# Patient Record
Sex: MALE | Race: WHITE | Employment: OTHER | URBAN - NONMETROPOLITAN AREA
[De-identification: names, ages, dates, MRNs, and addresses within clinical notes are randomized per-mention and may not be internally consistent; named-entity substitution may affect disease eponyms.]

---

## 2018-08-28 ENCOUNTER — HOSPITAL ENCOUNTER (OUTPATIENT)
Dept: PHYSICAL THERAPY | Age: 33
Setting detail: THERAPIES SERIES
Discharge: HOME | End: 2018-08-28
Payer: MEDICARE

## 2018-08-28 PROCEDURE — G8978 MOBILITY CURRENT STATUS: HCPCS

## 2018-08-28 PROCEDURE — 97162 PT EVAL MOD COMPLEX 30 MIN: CPT

## 2018-08-28 PROCEDURE — G8979 MOBILITY GOAL STATUS: HCPCS

## 2018-08-28 PROCEDURE — 97140 MANUAL THERAPY 1/> REGIONS: CPT

## 2018-08-28 ASSESSMENT — PAIN DESCRIPTION - PAIN TYPE: TYPE: CHRONIC PAIN

## 2018-08-28 ASSESSMENT — PAIN DESCRIPTION - LOCATION: LOCATION: LEG

## 2018-08-28 ASSESSMENT — PAIN SCALES - GENERAL: PAINLEVEL_OUTOF10: 4

## 2018-08-28 NOTE — PLAN OF CARE
[facility]           Phone: [phone]             Outpatient Physical Therapy  Fax: [phone]                                           Date: 2018  Patient: Dianna Chaney : 1985 CSN #: [id number]   Referring Practitioner:  Dr. 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 Pt has a history of cancer and diabetes that has contributed to his LE lymphedema. Pt will benefit from skilled PT in order to address these deficits.      [x] Primary Impairment :   G Code:    [x] Mobility         [] Carry        [] Body Position       [] Self Care      [] Other:   Functional Impairment Current:  [] 0%    [] 1-19% [] 20-39% [] 40-59% [x] 60-79%    [] 80-99% [] 100%  Functional Impairment Goal:  [] 0%    [] 1-19% [x] 20-39% [] 40-59% [] 60-79%    [] 80-99% [] 100%  G Code Functional Impairment determined by:  [x] Clinical Judgment   [] Outcome Measure:     Goals  Short term goals  Time Frame for Short term goals: 3 weeks  Short term goal 1: Pt will be educated on his POC and HEP-met  Short term goal 2: Pt will demonstrate 1-2cm decrease in BLE girth measurements in order to increase standing tolerance  Short term goal 3: Pt will be compliant with compression and elevation   Long term goals  Time Frame for Long term goals : 6 weeks  Long term goal 1: Pt will be independent with ankle pumps and compression   Long term goal 2: Pt will report 70% improvement in symptoms and leg pain with

## 2018-08-28 NOTE — PROGRESS NOTES
Phone: [phone]                       [facility]          Fax: [phone]                      Outpatient Physical Therapy                                                                      Evaluation  Date: 2018  Patient: Wayne Ann  : 1985  CSN #: [id number]  Referring Practitioner: Dr. Nahum Bhandari     Referral Date : 18     Diagnosis: lymphoma of lymph nodes head, face and neck C85.91    Treatment Diagnosis: BLE lymphedema   Onset Date: 18  PT Insurance Information: Medicare/ Medicaid   Total # of Visits Approved: 18   Total # of Visits to Date: 1  No Show: 0  Canceled Appointment: 0     Subjective  Subjective: Pt reports he was diagnosed with non hodgkins lymphoma in  and has been cancer free since , pt started with BLE edema that progressively got worse has his diabetes got worse from radiation treatments. Pt currently is not working and states he tries to keep his legs elevated and wears compression garments   Additional Pertinent Hx: non hodgkin's lymphoma, diabetes  Pain Screening  Patient Currently in Pain: Yes  Pain Assessment  Pain Assessment: 0-10  Pain Level: 4  Pain Type: Chronic pain  Pain Location: Leg          Objective     Observation/Palpation  Edema: R LE midfoot 27.3cm, ankle 29.9cm, 3\" above 31.9cm, 6\" above 37cm, midcalf 48.4cm, knee 46cm, midthigh 58cm: L LE midfoot 28.5cm, ankle 33.8cm, 3\" above 33.2cm, 6\" above 38.0cm, calf 50.4cm, knee 47.7cm, midthigh 62cm         Assessment  Assessment: Pt is a 28year old male that presents with chronic lower leg lymphedema. Pt has a history of cancer and diabetes that has contributed to his LE lymphedema. Pt will benefit from skilled PT in order to address these deficits.    Prognosis: Fair        Decision Making: Medium Complexity    Patient Education  Pt educated on her POC and HEP   Pt verbalized/demonstrated good understanding:     [x] Yes         [] No, pt required further

## 2018-08-31 ENCOUNTER — APPOINTMENT (OUTPATIENT)
Dept: PHYSICAL THERAPY | Age: 33
End: 2018-08-31
Payer: MEDICARE

## 2018-11-19 NOTE — DISCHARGE SUMMARY
decrease in girth measurements in order to increase ambulation tolerance   Long term goal 4: Pt will be fitted for at home pump for maintainence       Reason for Discharge  [] Goals Achieved                       [] Poor Follow Through/Attendance                  [] Optimal Function Achieved     [x] Patient Discharged Self    [] Hospitalization                         [] Physician discharge      Thank you for this referral      Paige Hernandez PT, DPT                    Date: 11/19/2018

## 2020-10-09 PROBLEM — E23.0 HYPOGONADOTROPIC HYPOGONADISM (HCC): Status: ACTIVE | Noted: 2020-10-09

## 2022-01-02 PROBLEM — E66.01 CLASS 3 SEVERE OBESITY DUE TO EXCESS CALORIES WITH SERIOUS COMORBIDITY AND BODY MASS INDEX (BMI) OF 45.0 TO 49.9 IN ADULT (HCC): Chronic | Status: ACTIVE | Noted: 2022-01-02

## 2022-01-02 PROBLEM — G47.33 OSA ON CPAP: Chronic | Status: ACTIVE | Noted: 2022-01-02

## 2022-01-02 PROBLEM — F31.9 AFFECTIVE PSYCHOSIS, BIPOLAR (HCC): Chronic | Status: ACTIVE | Noted: 2022-01-02

## 2022-01-02 PROBLEM — Z99.89 OSA ON CPAP: Chronic | Status: ACTIVE | Noted: 2022-01-02

## 2022-01-02 PROBLEM — E03.9 HYPOTHYROIDISM: Chronic | Status: ACTIVE | Noted: 2022-01-02

## 2022-01-02 PROBLEM — Z85.72 HX OF LYMPHOMA: Status: ACTIVE | Noted: 2022-01-02

## 2022-01-02 PROBLEM — G25.81 RLS (RESTLESS LEGS SYNDROME): Chronic | Status: ACTIVE | Noted: 2022-01-02

## 2022-01-02 PROBLEM — I10 PRIMARY HYPERTENSION: Chronic | Status: ACTIVE | Noted: 2022-01-02

## 2022-04-14 PROBLEM — D75.1 SECONDARY POLYCYTHEMIA: Status: ACTIVE | Noted: 2022-04-14

## 2022-05-12 PROBLEM — E11.40 TYPE 2 DIABETES MELLITUS WITH DIABETIC NEUROPATHY (HCC): Status: ACTIVE | Noted: 2022-05-12

## 2022-05-12 PROBLEM — E11.65 TYPE 2 DIABETES MELLITUS WITH HYPERGLYCEMIA (HCC): Status: ACTIVE | Noted: 2022-05-12

## 2022-06-17 PROBLEM — E11.69 TYPE 2 DIABETES MELLITUS WITH OTHER SPECIFIED COMPLICATION (HCC): Status: ACTIVE | Noted: 2022-06-17

## 2022-08-26 PROBLEM — K43.9 VENTRAL HERNIA WITHOUT OBSTRUCTION OR GANGRENE: Status: ACTIVE | Noted: 2022-08-26

## 2022-10-18 PROBLEM — E55.9 VITAMIN D DEFICIENCY: Status: ACTIVE | Noted: 2022-10-18

## 2022-10-18 PROBLEM — E78.2 MIXED HYPERLIPIDEMIA: Status: ACTIVE | Noted: 2022-10-18

## 2023-01-03 PROBLEM — R53.83 FATIGUE: Status: ACTIVE | Noted: 2023-01-03

## 2023-03-31 PROBLEM — E66.01 MORBID OBESITY DUE TO EXCESS CALORIES (HCC): Status: ACTIVE | Noted: 2022-01-02

## 2023-07-03 ENCOUNTER — HOSPITAL ENCOUNTER (OUTPATIENT)
Dept: PHYSICAL THERAPY | Age: 38
Setting detail: THERAPIES SERIES
Discharge: HOME | End: 2023-07-03
Payer: MEDICARE

## 2023-07-03 PROCEDURE — 97162 PT EVAL MOD COMPLEX 30 MIN: CPT

## 2023-07-03 PROCEDURE — 97140 MANUAL THERAPY 1/> REGIONS: CPT

## 2023-07-03 NOTE — PROGRESS NOTES
Phone: [address]          Fax: [phone]                      Outpatient Physical Therapy                                                             Lymphedema Evaluation  Date: 7/3/2023  Patient: Ania Light  : 1985  [facility] #: [id number]  Referring Physician: Referring Provider (secondary): Montana Castillo    Diagnosis: BLE lyphedema    Treatment Diagnosis: BLe lymphedema  Onset Date: 23  PT Insurance Information: Medicare/Medicaid  Total # of Visits Approved: 12   Total # of Visits to Date: 1     Subjective  Subjective: Pt reports a chronic history of BLE lymphedema and cellulitis. pt initiate pump treatments in 2018 but had to stop due to chronic cellulitis. pt states he has not had cellulitis in approx one year and wants to start treatments again.  Pt has a history of diabetic neuropathy and partial toe amputation on L foot along with an unhealed wound being doctored by Dr. Toñito Card  Additional Pertinent Hx: cancer hodgkins lymphoma, diabetes, HTN, anxiety, bipolar, depression    Lymph Assessment      Skin Integumentary:   Skin Integrity: Wound (add Wound LDA) (wound L third toe)  Pitting Scale Area 1: 1+  Skin Color: Red  Skin Texture: Hyperpigmentation  Turgor: Shiney/hard  Hair Growth: Absent  Edema Rebound: Quick  Stemmer Sign: Positive    Signs of Constriction (if applicable):   Fibrotic Areas: Yes    Stage of Lymphedema: Stage 2: Spontaneously Irreversible Stage  Description:      Lymphedema Classification:   Type: Secondary Lymphedema  Left: Moderate     Right: Moderate    Measurements: Area Measured: R LE, L LE (R LE 3\" above ankle 36.2cm, 6\" above 42.4cm: L LE 3\" above 39cm, 6\" above 46.3cm)      Right Measurements Left Measurements   R LE Pre Girth Measurement (cm)  5th Tuberosity (cm): 25.7  Lateral malleolus: 31.6  Calf (cm): 53.1  Mid Knee (cm): 56.2  Thigh (cm): 65.1  Total Girth (cm): 231.7 L LE Pre Girth Measurement (cm)  5th Tuberosity

## 2023-07-06 ENCOUNTER — HOSPITAL ENCOUNTER (OUTPATIENT)
Dept: PHYSICAL THERAPY | Age: 38
Setting detail: THERAPIES SERIES
Discharge: HOME | End: 2023-07-06
Payer: MEDICARE

## 2023-07-06 PROCEDURE — 97110 THERAPEUTIC EXERCISES: CPT

## 2023-07-06 PROCEDURE — 97016 VASOPNEUMATIC DEVICE THERAPY: CPT

## 2023-07-06 PROCEDURE — 97140 MANUAL THERAPY 1/> REGIONS: CPT

## 2023-07-06 NOTE — PROGRESS NOTES
Phone: [address]          Fax: [phone]                      Outpatient Physical Therapy                                                             Lymphedema Treatment  Date: 2023  Patient: Erica Lechuga  : 1985  [facility] #: [id number]  Referring Physician: Referring Provider (secondary): Mil Lemus    Diagnosis: BLE lyphedema    Treatment Diagnosis: BLe lymphedema  Onset Date: 23  PT Insurance Information: Medicare/Medicaid  Total # of Visits Approved: 12   Total # of Visits to Date: 2  No Show: 0  Canceled Appointment: 0     Subjective  Subjective: Pt states he is doing ok today, 5/10 B LE pain. Pt states his legs feel tight today, reports compliance with compression daily.   Additional Pertinent Hx: cancer hodgkins lymphoma, diabetes, HTN, anxiety, bipolar, depression    Lymph Assessment  Skin Integumentary:   Location Description: L 3rd toe  Skin Integrity: Wound (add Wound LDA)  Pitting Scale Area 1: 1+  Skin Color: Red  Skin Texture: Hyperpigmentation  Turgor: Shiney/hard  Hair Growth: Absent  Edema Rebound: Quick  Stemmer Sign: Positive    Signs of Constriction (if applicable):   Papilloma (benign tumor arising from an epithelial layer): Yes  Fibrotic Areas: Yes  Lymphorrhea: No    Stage of Lymphedema: Stage 2: Spontaneously Irreversible Stage  Description:      Lymphedema Classification:   Type: Secondary Lymphedema  Left: Moderate     Right: Moderate    Exercises:  Exercise 1: HEPpt educated on keeping his legs elevated and compressed at 30 mmHg, performing daily exercises including ankle pumps and reducing sodium and sugar in diet    Manual:  Soft Tissue Mobilizaton: MLD to B LE     Skin Integumentary  Skin Color: Red  Skin Integrity: Wound (add Wound LDA)  Turgor: Shiney/hard  Hair Growth: Absent  RLE Complete Decongestion Therapy  Scale: Stage 2  Lymph Drainage Pattern: Lower extremity  Compression Technique: Vaso-pneumatic pump  Force

## 2023-07-11 PROBLEM — Z89.422 HISTORY OF AMPUTATION OF LESSER TOE OF LEFT FOOT (HCC): Status: ACTIVE | Noted: 2023-07-11

## 2023-07-11 PROBLEM — S91.302A OPEN WOUND OF LEFT FOOT: Status: ACTIVE | Noted: 2023-07-11

## 2023-07-12 ENCOUNTER — HOSPITAL ENCOUNTER (OUTPATIENT)
Dept: PHYSICAL THERAPY | Age: 38
Setting detail: THERAPIES SERIES
Discharge: HOME | End: 2023-07-12
Payer: MEDICARE

## 2023-07-12 PROBLEM — I87.2 VENOUS INSUFFICIENCY OF BOTH LOWER EXTREMITIES: Status: ACTIVE | Noted: 2023-07-12

## 2023-07-12 PROCEDURE — 97140 MANUAL THERAPY 1/> REGIONS: CPT

## 2023-07-12 PROCEDURE — 97016 VASOPNEUMATIC DEVICE THERAPY: CPT

## 2023-07-12 PROCEDURE — 97110 THERAPEUTIC EXERCISES: CPT

## 2023-07-12 NOTE — PROGRESS NOTES
Phone: [address]          Fax: [phone]                      Outpatient Physical Therapy                                                             Lymphedema Treatment  Date: 2023  Patient: Marlene Yanes  : 1985  [facility] #: [id number]  Referring Physician: Referring Provider (secondary): Bibiana Hidalgo    Diagnosis: BLE lyphedema    Treatment Diagnosis: BLe lymphedema  Onset Date: 23  PT Insurance Information: Medicare/Medicaid  Total # of Visits Approved: 12   Total # of Visits to Date: 3  No Show: 0  Canceled Appointment: 0     Subjective  Subjective: Pt. reports doing well after compression pump use but now feels today they are \"heavy and full\" feeling. Additional Pertinent Hx: cancer hodgkins lymphoma, diabetes, HTN, anxiety, bipolar, depression    Skin Integumentary:   Location Description: L 3rd toe: band aid over top, seeing Dr. Meño Thapa for follow up next week.   Skin Integrity: Wound (add Wound LDA)  Pitting Scale Area 1: 1+  Skin Color: Red  Skin Texture: Hyperpigmentation  Skin Condition/Temp: Cool  Turgor: Shiney/hard  Hair Growth: Absent  Edema Rebound: Quick  Stemmer Sign: Positive    Signs of Constriction (if applicable):   Papilloma (benign tumor arising from an epithelial layer): Yes  Fibrotic Areas: Yes  Lymphorrhea: No    Stage of Lymphedema: Stage 2: Spontaneously Irreversible Stage  Description:      Lymphedema Classification:   Type: Secondary Lymphedema  Left: Moderate     Right: Moderate      Exercises:  Exercise 1: HEPpt educated on keeping his legs elevated and compressed at 30 mmHg, performing daily exercises including ankle pumps and reducing sodium and sugar in diet    Manual:  Soft Tissue Mobilizaton: MLD to B LE     Skin Integumentary  Skin Color: Red  Skin Condition/Temp: Cool  Skin Integrity: Wound (add Wound LDA)  Turgor: Shiney/hard  Hair Growth: Absent  RLE Complete Decongestion Therapy  Scale: Stage 2  Lymph

## 2023-07-19 ENCOUNTER — HOSPITAL ENCOUNTER (OUTPATIENT)
Dept: PHYSICAL THERAPY | Age: 38
Setting detail: THERAPIES SERIES
Discharge: HOME | End: 2023-07-19
Payer: MEDICARE

## 2023-07-19 PROCEDURE — 97140 MANUAL THERAPY 1/> REGIONS: CPT

## 2023-07-19 PROCEDURE — 97016 VASOPNEUMATIC DEVICE THERAPY: CPT

## 2023-07-19 PROCEDURE — 97110 THERAPEUTIC EXERCISES: CPT

## 2023-07-24 ENCOUNTER — APPOINTMENT (OUTPATIENT)
Dept: PHYSICAL THERAPY | Age: 38
End: 2023-07-24
Payer: MEDICARE

## 2023-07-25 ENCOUNTER — HOSPITAL ENCOUNTER (OUTPATIENT)
Dept: WOUND CARE | Age: 38
Discharge: HOME | End: 2023-07-25
Payer: MEDICARE

## 2023-07-25 VITALS
RESPIRATION RATE: 24 BRPM | BODY MASS INDEX: 40.43 KG/M2 | WEIGHT: 315 LBS | SYSTOLIC BLOOD PRESSURE: 141 MMHG | TEMPERATURE: 97.3 F | HEART RATE: 87 BPM | DIASTOLIC BLOOD PRESSURE: 69 MMHG | HEIGHT: 74 IN

## 2023-07-25 DIAGNOSIS — E08.621 DIABETIC ULCER OF TOE OF LEFT FOOT ASSOCIATED WITH DIABETES MELLITUS DUE TO UNDERLYING CONDITION, WITH NECROSIS OF BONE (HCC): Primary | ICD-10-CM

## 2023-07-25 DIAGNOSIS — M20.62 DEFORMITY OF TOE OF LEFT FOOT: ICD-10-CM

## 2023-07-25 DIAGNOSIS — M86.9 OSTEOMYELITIS OF TOE OF LEFT FOOT (HCC): ICD-10-CM

## 2023-07-25 DIAGNOSIS — E11.42 DIABETIC POLYNEUROPATHY ASSOCIATED WITH TYPE 2 DIABETES MELLITUS (HCC): ICD-10-CM

## 2023-07-25 DIAGNOSIS — L97.524 DIABETIC ULCER OF TOE OF LEFT FOOT ASSOCIATED WITH DIABETES MELLITUS DUE TO UNDERLYING CONDITION, WITH NECROSIS OF BONE (HCC): Primary | ICD-10-CM

## 2023-07-25 PROCEDURE — 99203 OFFICE O/P NEW LOW 30 MIN: CPT | Performed by: PODIATRIST

## 2023-07-25 PROCEDURE — 99203 OFFICE O/P NEW LOW 30 MIN: CPT

## 2023-07-25 NOTE — PROGRESS NOTES
Diabetic Quality Measure Benchmarks:    FSBS:  RESULT 286        0800   Hemoglobin A1c Controlled and is < 8%=   Lab Results   Component Value Date    LABA1C 8.6 (H) 03/28/2023         LDL is <100         Yes  Blood Pressure is under < 140/90               Yes  Tobacco Non use                                         Yes  Aspirin Use/blood thinner                                                    No    Information sent to PCP                                No    Instructions given to Patient regarding follow up and or education      No
PTA [facility] PT [place]   8/24/2023  2:00 PM LAURE Ulrich - CNP [facility] TARHE [facility] [place]   10/17/2023  2:00 PM Kevin Valencia MD [facility] PULM [facility]         SURVEY:    You may be receiving a survey from [employer] regarding your visit today. Please complete the survey to enable us to provide the highest quality of care to you and your family. If you cannot score us a very good on any question, please call the office to discuss how we could have made your experience a very good one. Thank you. REMINDER:  You will receive 2 bills for each visit.   One is a professional fee charge for the physician and the other is a facility fee for the hospital.            GERI Deutsch [facility]  7/25/2023

## 2023-07-25 NOTE — DISCHARGE INSTRUCTIONS
Wound Management Patient Discharge Instructions    CALL [phone] for questions regarding care of your wounds. OFFICE HOURS ARE TUESDAYS MORNINGS AND THURSDAYS(9-2:30) and subject to change without notice    PLEASE ARRIVE PRIOR TO APPOINTMENT TIME TO COMPLETE REGISTRATION PROCESS      YOU WILL RECEIVE AN AUTOMATED APPOINTMENT REMINDER CALL SEVERAL DAYS PRIOR TO YOUR APPOINTMENT       Discharge instructions for the patient's plan of care. Wound care:continue same wound care as ordered by Dr. Malika White      Next appointment:  Future Appointments   Date Time Provider [address]   7/28/2023  3:00 PM Elizabeth Queen MD [facility] END [facility]   8/1/2023 11:30 AM Cherise Kraus, PT [facility] PT [place]   8/9/2023  2:15 PM Cecelia Kern, PTA [facility] PT [place]   8/16/2023  1:00 PM Cecelia Kern, PTA [facility] PT [place]   8/23/2023  1:00 PM Cecelia Kern, PTA [facility] PT [place]   8/24/2023  2:00 PM LAURE Downs - CNP [facility] TARHE [facility]   10/17/2023  2:00 PM Kevin Blue MD [facility] PULM [facility]         SURVEY:    You may be receiving a survey from [employer] regarding your visit today. Please complete the survey to enable us to provide the highest quality of care to you and your family. If you cannot score us a very good on any question, please call the office to discuss how we could have made your experience a very good one. Thank you. REMINDER:  You will receive 2 bills for each visit.   One is a professional fee charge for the physician and the other is a facility fee for the hospital.

## 2023-07-26 ENCOUNTER — ANESTHESIA EVENT (OUTPATIENT)
Dept: OPERATING ROOM | Age: 38
End: 2023-07-26
Payer: MEDICARE

## 2023-07-26 NOTE — PROGRESS NOTES
Left message with PAT to instruct patient not to take Victoza, if he is due until after surgery/anesthesia.

## 2023-07-28 ENCOUNTER — HOSPITAL ENCOUNTER (OUTPATIENT)
Age: 38
Setting detail: OUTPATIENT SURGERY
Discharge: HOME | End: 2023-07-28
Attending: PODIATRIST | Admitting: PODIATRIST
Payer: MEDICARE

## 2023-07-28 ENCOUNTER — ANESTHESIA (OUTPATIENT)
Dept: OPERATING ROOM | Age: 38
End: 2023-07-28
Payer: MEDICARE

## 2023-07-28 VITALS
HEIGHT: 74 IN | BODY MASS INDEX: 40.43 KG/M2 | RESPIRATION RATE: 18 BRPM | DIASTOLIC BLOOD PRESSURE: 65 MMHG | HEART RATE: 78 BPM | SYSTOLIC BLOOD PRESSURE: 126 MMHG | TEMPERATURE: 96.9 F | WEIGHT: 315 LBS | OXYGEN SATURATION: 96 %

## 2023-07-28 DIAGNOSIS — M86.9 OSTEOMYELITIS OF TOE OF LEFT FOOT (HCC): Primary | ICD-10-CM

## 2023-07-28 DIAGNOSIS — L40.3 SAPHO SYNDROME (HCC): ICD-10-CM

## 2023-07-28 DIAGNOSIS — M86.9 SAPHO SYNDROME (HCC): ICD-10-CM

## 2023-07-28 DIAGNOSIS — M65.9 SAPHO SYNDROME (HCC): ICD-10-CM

## 2023-07-28 DIAGNOSIS — L70.9 SAPHO SYNDROME (HCC): ICD-10-CM

## 2023-07-28 DIAGNOSIS — M85.80 SAPHO SYNDROME (HCC): ICD-10-CM

## 2023-07-28 LAB — GLUCOSE BLD-MCNC: 213 MG/DL (ref 74–100)

## 2023-07-28 PROCEDURE — A4216 STERILE WATER/SALINE, 10 ML: HCPCS

## 2023-07-28 PROCEDURE — 6360000002 HC RX W HCPCS: Performed by: NURSE ANESTHETIST, CERTIFIED REGISTERED

## 2023-07-28 PROCEDURE — 6360000002 HC RX W HCPCS

## 2023-07-28 PROCEDURE — 7100000001 HC PACU RECOVERY - ADDTL 15 MIN: Performed by: PODIATRIST

## 2023-07-28 PROCEDURE — 2500000003 HC RX 250 WO HCPCS

## 2023-07-28 PROCEDURE — 2580000003 HC RX 258: Performed by: PODIATRIST

## 2023-07-28 PROCEDURE — 6360000002 HC RX W HCPCS: Performed by: PODIATRIST

## 2023-07-28 PROCEDURE — 87070 CULTURE OTHR SPECIMN AEROBIC: CPT

## 2023-07-28 PROCEDURE — 82947 ASSAY GLUCOSE BLOOD QUANT: CPT

## 2023-07-28 PROCEDURE — 7100000011 HC PHASE II RECOVERY - ADDTL 15 MIN: Performed by: PODIATRIST

## 2023-07-28 PROCEDURE — 2580000003 HC RX 258

## 2023-07-28 PROCEDURE — 2709999900 HC NON-CHARGEABLE SUPPLY: Performed by: PODIATRIST

## 2023-07-28 PROCEDURE — 86403 PARTICLE AGGLUT ANTBDY SCRN: CPT

## 2023-07-28 PROCEDURE — 3700000001 HC ADD 15 MINUTES (ANESTHESIA): Performed by: PODIATRIST

## 2023-07-28 PROCEDURE — 7100000010 HC PHASE II RECOVERY - FIRST 15 MIN: Performed by: PODIATRIST

## 2023-07-28 PROCEDURE — 3700000000 HC ANESTHESIA ATTENDED CARE: Performed by: PODIATRIST

## 2023-07-28 PROCEDURE — 6370000000 HC RX 637 (ALT 250 FOR IP)

## 2023-07-28 PROCEDURE — 7100000000 HC PACU RECOVERY - FIRST 15 MIN: Performed by: PODIATRIST

## 2023-07-28 PROCEDURE — 3600000003 HC SURGERY LEVEL 3 BASE: Performed by: PODIATRIST

## 2023-07-28 PROCEDURE — 87205 SMEAR GRAM STAIN: CPT

## 2023-07-28 PROCEDURE — 3600000013 HC SURGERY LEVEL 3 ADDTL 15MIN: Performed by: PODIATRIST

## 2023-07-28 PROCEDURE — 87075 CULTR BACTERIA EXCEPT BLOOD: CPT

## 2023-07-28 PROCEDURE — 2500000003 HC RX 250 WO HCPCS: Performed by: NURSE ANESTHETIST, CERTIFIED REGISTERED

## 2023-07-28 RX ORDER — SODIUM CHLORIDE 0.9 % (FLUSH) 0.9 %
5-40 SYRINGE (ML) INJECTION PRN
Status: CANCELLED | OUTPATIENT
Start: 2023-07-28

## 2023-07-28 RX ORDER — SODIUM CHLORIDE, SODIUM LACTATE, POTASSIUM CHLORIDE, CALCIUM CHLORIDE 600; 310; 30; 20 MG/100ML; MG/100ML; MG/100ML; MG/100ML
INJECTION, SOLUTION INTRAVENOUS CONTINUOUS
Status: DISCONTINUED | OUTPATIENT
Start: 2023-07-28 | End: 2023-07-28 | Stop reason: HOSPADM

## 2023-07-28 RX ORDER — HYDROCODONE BITARTRATE AND ACETAMINOPHEN 5; 325 MG/1; MG/1
1 TABLET ORAL EVERY 6 HOURS PRN
Status: CANCELLED | OUTPATIENT
Start: 2023-07-28

## 2023-07-28 RX ORDER — FENTANYL CITRATE 50 UG/ML
50 INJECTION, SOLUTION INTRAMUSCULAR; INTRAVENOUS EVERY 5 MIN PRN
Status: CANCELLED | OUTPATIENT
Start: 2023-07-28

## 2023-07-28 RX ORDER — SODIUM CHLORIDE 0.9 % (FLUSH) 0.9 %
5-40 SYRINGE (ML) INJECTION EVERY 12 HOURS SCHEDULED
Status: CANCELLED | OUTPATIENT
Start: 2023-07-28

## 2023-07-28 RX ORDER — KETOROLAC TROMETHAMINE 30 MG/ML
INJECTION, SOLUTION INTRAMUSCULAR; INTRAVENOUS PRN
Status: DISCONTINUED | OUTPATIENT
Start: 2023-07-28 | End: 2023-07-28 | Stop reason: SDUPTHER

## 2023-07-28 RX ORDER — GENTAMICIN SULFATE 40 MG/ML
INJECTION, SOLUTION INTRAMUSCULAR; INTRAVENOUS
Status: DISCONTINUED
Start: 2023-07-28 | End: 2023-07-28 | Stop reason: WASHOUT

## 2023-07-28 RX ORDER — SODIUM CHLORIDE 9 MG/ML
INJECTION, SOLUTION INTRAVENOUS PRN
Status: CANCELLED | OUTPATIENT
Start: 2023-07-28

## 2023-07-28 RX ORDER — PROPOFOL 10 MG/ML
INJECTION, EMULSION INTRAVENOUS PRN
Status: DISCONTINUED | OUTPATIENT
Start: 2023-07-28 | End: 2023-07-28 | Stop reason: SDUPTHER

## 2023-07-28 RX ORDER — BUPIVACAINE HYDROCHLORIDE 5 MG/ML
INJECTION, SOLUTION EPIDURAL; INTRACAUDAL
Status: DISCONTINUED
Start: 2023-07-28 | End: 2023-07-28 | Stop reason: HOSPADM

## 2023-07-28 RX ORDER — HYDROCODONE BITARTRATE AND ACETAMINOPHEN 5; 325 MG/1; MG/1
1 TABLET ORAL EVERY 4 HOURS PRN
Qty: 30 TABLET | Refills: 0 | Status: SHIPPED | OUTPATIENT
Start: 2023-07-28 | End: 2023-08-02

## 2023-07-28 RX ORDER — SUCCINYLCHOLINE CHLORIDE 20 MG/ML
INJECTION INTRAMUSCULAR; INTRAVENOUS PRN
Status: DISCONTINUED | OUTPATIENT
Start: 2023-07-28 | End: 2023-07-28 | Stop reason: SDUPTHER

## 2023-07-28 RX ORDER — ONDANSETRON 2 MG/ML
4 INJECTION INTRAMUSCULAR; INTRAVENOUS EVERY 6 HOURS PRN
Status: DISCONTINUED | OUTPATIENT
Start: 2023-07-28 | End: 2023-07-28 | Stop reason: HOSPADM

## 2023-07-28 RX ORDER — BUPIVACAINE HYDROCHLORIDE 5 MG/ML
INJECTION, SOLUTION EPIDURAL; INTRACAUDAL PRN
Status: DISCONTINUED | OUTPATIENT
Start: 2023-07-28 | End: 2023-07-28 | Stop reason: ALTCHOICE

## 2023-07-28 RX ORDER — LIDOCAINE HYDROCHLORIDE 20 MG/ML
INJECTION, SOLUTION EPIDURAL; INFILTRATION; INTRACAUDAL; PERINEURAL PRN
Status: DISCONTINUED | OUTPATIENT
Start: 2023-07-28 | End: 2023-07-28 | Stop reason: SDUPTHER

## 2023-07-28 RX ORDER — DOXYCYCLINE HYCLATE 100 MG
100 TABLET ORAL 2 TIMES DAILY
Qty: 14 TABLET | Refills: 0 | Status: SHIPPED | OUTPATIENT
Start: 2023-07-28 | End: 2023-08-04

## 2023-07-28 RX ORDER — METOCLOPRAMIDE HYDROCHLORIDE 5 MG/ML
5 INJECTION INTRAMUSCULAR; INTRAVENOUS ONCE
Status: DISCONTINUED | OUTPATIENT
Start: 2023-07-28 | End: 2023-07-28 | Stop reason: SDUPTHER

## 2023-07-28 RX ORDER — ROCURONIUM BROMIDE 10 MG/ML
INJECTION, SOLUTION INTRAVENOUS PRN
Status: DISCONTINUED | OUTPATIENT
Start: 2023-07-28 | End: 2023-07-28 | Stop reason: SDUPTHER

## 2023-07-28 RX ORDER — METOCLOPRAMIDE HYDROCHLORIDE 5 MG/ML
10 INJECTION INTRAMUSCULAR; INTRAVENOUS ONCE
Status: COMPLETED | OUTPATIENT
Start: 2023-07-28 | End: 2023-07-28

## 2023-07-28 RX ORDER — DIMENHYDRINATE 50 MG
50 TABLET ORAL ONCE
Status: COMPLETED | OUTPATIENT
Start: 2023-07-28 | End: 2023-07-28

## 2023-07-28 RX ORDER — VANCOMYCIN HYDROCHLORIDE 1 G/20ML
INJECTION, POWDER, LYOPHILIZED, FOR SOLUTION INTRAVENOUS
Status: DISCONTINUED
Start: 2023-07-28 | End: 2023-07-28 | Stop reason: WASHOUT

## 2023-07-28 RX ORDER — FENTANYL CITRATE 50 UG/ML
INJECTION, SOLUTION INTRAMUSCULAR; INTRAVENOUS PRN
Status: DISCONTINUED | OUTPATIENT
Start: 2023-07-28 | End: 2023-07-28 | Stop reason: SDUPTHER

## 2023-07-28 RX ORDER — ONDANSETRON 2 MG/ML
INJECTION INTRAMUSCULAR; INTRAVENOUS PRN
Status: DISCONTINUED | OUTPATIENT
Start: 2023-07-28 | End: 2023-07-28 | Stop reason: SDUPTHER

## 2023-07-28 RX ORDER — LIDOCAINE HYDROCHLORIDE 10 MG/ML
INJECTION, SOLUTION INFILTRATION; PERINEURAL
Status: DISCONTINUED
Start: 2023-07-28 | End: 2023-07-28 | Stop reason: WASHOUT

## 2023-07-28 RX ORDER — ACETAMINOPHEN 325 MG/1
650 TABLET ORAL ONCE
Status: COMPLETED | OUTPATIENT
Start: 2023-07-28 | End: 2023-07-28

## 2023-07-28 RX ADMIN — KETOROLAC TROMETHAMINE 15 MG: 30 INJECTION, SOLUTION INTRAMUSCULAR; INTRAVENOUS at 16:10

## 2023-07-28 RX ADMIN — FAMOTIDINE 20 MG: 10 INJECTION, SOLUTION INTRAVENOUS at 13:52

## 2023-07-28 RX ADMIN — FENTANYL CITRATE 25 MCG: 50 INJECTION INTRAMUSCULAR; INTRAVENOUS at 15:50

## 2023-07-28 RX ADMIN — ROCURONIUM BROMIDE 10 MG: 10 INJECTION, SOLUTION INTRAVENOUS at 15:36

## 2023-07-28 RX ADMIN — PROPOFOL 200 MG: 10 INJECTION, EMULSION INTRAVENOUS at 15:36

## 2023-07-28 RX ADMIN — ONDANSETRON 4 MG: 2 INJECTION INTRAMUSCULAR; INTRAVENOUS at 16:10

## 2023-07-28 RX ADMIN — ACETAMINOPHEN 650 MG: 325 TABLET ORAL at 13:44

## 2023-07-28 RX ADMIN — SODIUM CHLORIDE, POTASSIUM CHLORIDE, SODIUM LACTATE AND CALCIUM CHLORIDE: 600; 310; 30; 20 INJECTION, SOLUTION INTRAVENOUS at 13:51

## 2023-07-28 RX ADMIN — METOCLOPRAMIDE 10 MG: 5 INJECTION, SOLUTION INTRAMUSCULAR; INTRAVENOUS at 13:52

## 2023-07-28 RX ADMIN — SUCCINYLCHOLINE CHLORIDE 140 MG: 20 INJECTION, SOLUTION INTRAMUSCULAR; INTRAVENOUS at 15:36

## 2023-07-28 RX ADMIN — DIMENHYDRINATE 50 MG: 50 TABLET ORAL at 13:44

## 2023-07-28 RX ADMIN — FENTANYL CITRATE 25 MCG: 50 INJECTION INTRAMUSCULAR; INTRAVENOUS at 15:47

## 2023-07-28 RX ADMIN — VANCOMYCIN HYDROCHLORIDE 1000 MG: 1 INJECTION, POWDER, LYOPHILIZED, FOR SOLUTION INTRAVENOUS at 14:07

## 2023-07-28 RX ADMIN — LIDOCAINE HYDROCHLORIDE 5 ML: 20 INJECTION, SOLUTION EPIDURAL; INFILTRATION; INTRACAUDAL; PERINEURAL at 15:41

## 2023-07-28 RX ADMIN — ONDANSETRON 4 MG: 2 INJECTION INTRAMUSCULAR; INTRAVENOUS at 13:56

## 2023-07-28 RX ADMIN — FENTANYL CITRATE 25 MCG: 50 INJECTION INTRAMUSCULAR; INTRAVENOUS at 15:36

## 2023-07-28 RX ADMIN — LIDOCAINE HYDROCHLORIDE 5 ML: 20 INJECTION, SOLUTION EPIDURAL; INFILTRATION; INTRACAUDAL; PERINEURAL at 16:12

## 2023-07-28 RX ADMIN — LIDOCAINE HYDROCHLORIDE 5 ML: 20 INJECTION, SOLUTION EPIDURAL; INFILTRATION; INTRACAUDAL; PERINEURAL at 15:36

## 2023-07-28 ASSESSMENT — PAIN - FUNCTIONAL ASSESSMENT: PAIN_FUNCTIONAL_ASSESSMENT: 0-10

## 2023-07-28 NOTE — ANESTHESIA POSTPROCEDURE EVALUATION
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     Jovani Phase I: Jovani Score: 10    Jovani Phase II: Jovani Score: 10      Anesthesia Post Evaluation    Patient location during evaluation: bedside  Patient participation: complete - patient participated  Level of consciousness: awake and alert  Airway patency: patent  Nausea & Vomiting: no nausea and no vomiting  Complications: no  Cardiovascular status: hemodynamically stable  Respiratory status: acceptable  Hydration status: stable  Multimodal analgesia pain management approach  Pain management: satisfactory to patient

## 2023-07-28 NOTE — DISCHARGE INSTRUCTIONS
1.  Keep the surgical dressing clean, dry, and intact  2. Elevate the operative extremity above heart level when seated or lying  3. Limit activity and focus weightbearing on the heel in surgical shoe. SAME DAY SURGERY DISCHARGE INSTRUCTIONS    1. Do not drive or operate hazardous machinery for 24 hours. 2.  Do not make important personal or business decisions for 24 hours. 3.  Do not drink alcoholic beverages for 24 hours. 4.  Do not smoke tobacco products for 24 hours. 5.  Eat light foods (Jell-O, soups, etc....) and drink plenty of fluids (water, Sprite, etc...) up to 8 glasses per day, as you can tolerate. 6.  If your bandages become soaked with bright red blood, place another dressing pad over your bandages. (DO NOT remove original bandage.)  Call your surgeon for further instructions. A small amount of bright red blood is to be expected. 7.  Limit your activities for 24 hours. Do not engage in heavy work until your surgeon gives you permission. 8.  Patient should not be left alone for 12-24 hours following surgical procedure. 9.  Report the following signs or any questions regarding your physical condition to your surgeon immediately:    Excessive swelling of, or around the wound area. Redness. Temperature of 100 degrees (F) or above. Excessive pain. 10.  Call your surgeon for any questions regarding your surgery. 11.  Wash hands before and after incision care. It is important to practice good personal hygiene during the post op period.

## 2023-07-28 NOTE — PROGRESS NOTES
Patient alert & oriented. Phase I criteria met. Iglesia Rodrigues states patient okay to move to Phase 2 at this time.

## 2023-07-28 NOTE — PROGRESS NOTES
Patient states ready to be discharged at this time. Discharge instructions given to pt and mom. Both verbalize understanding,deny any questions and/or concerns. Pt transfer off unit in wheelchair w/ all belongings. Discharge Criteria    Inpatients must meet Criteria 1 through 7. All other patients are either YES or N/A. If a NO is chosen then Anesthesia or Surgeon must be notified. 1.  Minimum 30 minutes after last dose of sedative medication. Yes      2. Systolic BP between 90 - 416. Diastolic BP between 60 - 90. Yes      3. Pulse between 60 - 120    Yes      4. Respirations between 8 - 25. Yes      5. SpO2 92% - 100%. Yes      6. Able to cough and swallow or return to baseline function. Yes      7. Alert and oriented or return to baseline mental status. Yes      8. Demonstrates controlled, coordinated movements, ambulates with steady gait, or return to baseline activity function. Yes      9. Minimal or no pain or nausea, or at a level tolerable and acceptable to patient. Yes      10. Takes and retains oral fluids as allowed. Yes      11. Procedural / perioperative site stable. Minimal or no bleeding. Yes          12. If GI endoscopy procedure, minimal or no abdominal distention or passing flatus. N/A      13. Written discharge instructions and emergency telephone number provided. Yes      14. Accompanied by a responsible adult.     Yes

## 2023-07-28 NOTE — BRIEF OP NOTE
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]    Post-Op Diagnosis: Same       Procedure(s):  TOE AMPUTATION-PARTIAL AMPUTATION 3RD DIGIT    Surgeon(s):  Sheela Pope DPM    Assistant:  * No surgical staff found *    Anesthesia: Monitor Anesthesia Care    Estimated Blood Loss (mL): Minimal    Complications: None    Specimens:   ID Type Source Tests Collected by Time Destination   1 : Third toe right foot for anaerobic and aerobic culture Tissue Toe CULTURE, ANAEROBIC AND AEROBIC Sheela Pope DPM 7/28/2023 1555        Implants:  * No implants in log *      Drains: * No LDAs found *    Findings: See the narrative      Electronically signed by Sheela Pope DPM on 7/28/2023 at 4:16 PM

## 2023-07-28 NOTE — OP NOTE
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]    Post-Op Diagnosis: Same       Procedure(s):  TOE AMPUTATION-PARTIAL AMPUTATION 3RD DIGIT    Surgeon(s):  Liudmila English DPM    Assistant:   * No surgical staff found *    Anesthesia: Monitor Anesthesia Care    Estimated Blood Loss (mL): Minimal    Complications: None    Specimens:   ID Type Source Tests Collected by Time Destination   1 : Third toe right foot for anaerobic and aerobic culture Tissue Toe CULTURE, ANAEROBIC AND AEROBIC Liudmila English DPM 7/28/2023 1555        Implants:  * No implants in log *      Drains: * No LDAs found *    Findings: See the narrative        Detailed Description of Procedure: The patient has signs and symptoms, both clinically and radiographically, that are consistent with the preprocedure diagnosis. It was determined the patient would benefit from surgical intervention. All potential risks, benefits, and complications of surgery were discussed with the patient prior the procedure. The patient wished to proceed with surgery. Informed written consent was obtained. The patient was brought from the preoperative area placed in operating table in supine position. Following induction of anesthesia, the operative lower extremity was scrubbed, prepped, and draped in usual sterile fashion. The following procedure was then performed:  Procedure: Partial amputation, third digit, left foot: Attention was directed to the dorsal aspect of the left third digit where a fishmouth configured incision was made distal to the level of the proximal interphalangeal joint. This was deepened through subcutaneous layer down to the level of the deep tissues. The digit was sharply disarticulated at the level of the PIPJ. The wound was copiously irrigated with sterile saline.   The incision

## 2023-07-30 LAB
MICROORGANISM SPEC CULT: ABNORMAL
MICROORGANISM SPEC CULT: ABNORMAL
MICROORGANISM/AGENT SPEC: ABNORMAL
MICROORGANISM/AGENT SPEC: ABNORMAL
SPECIMEN DESCRIPTION: ABNORMAL

## 2024-02-15 NOTE — PROGRESS NOTES
Detail Level: Simple Phone: [address]          Fax: [phone]                      Outpatient Physical Therapy                                                             Lymphedema Treatment  Date: 2023  Patient: Leland Agulia  : 1985  [facility] #: [id number]  Referring Physician: Referring Provider (secondary): Kevin Gaming    Diagnosis: BLE lyphedema    Treatment Diagnosis: BLe lymphedema  Onset Date: 23  PT Insurance Information: Medicare/Medicaid  Total # of Visits Approved: 12   Total # of Visits to Date: 4  No Show: 0  Canceled Appointment: 0     Subjective  Subjective: Pt states he can tell that the pumps are working, his legs feel good for ~1-2 days after the pumps but as the week progresses his legs get \"heavier\".   Additional Pertinent Hx: cancer hodgkins lymphoma, diabetes, HTN, anxiety, bipolar, depression    Lymph Assessment  Skin Integumentary:   Location Description: L 3rd toe: band aid over top, seeing Dr. Connie Mojica for follow up tomorrow  Skin Integrity: Wound (add Wound LDA)  Pitting Area 1 Described: B lower legs  Pitting Scale Area 1: 1+  Skin Color: Red  Skin Texture: Hyperpigmentation  Skin Condition/Temp: Cool  Turgor: Shiney/hard  Hair Growth: Absent  Edema Rebound: Quick  Stemmer Sign: Positive    Signs of Constriction (if applicable):   Papilloma (benign tumor arising from an epithelial layer): Yes  Fibrotic Areas: Yes  Lymphorrhea: No    Stage of Lymphedema: Stage 2: Spontaneously Irreversible Stage  Description:      Lymphedema Classification:   Type: Secondary Lymphedema  Left: Moderate     Right: Moderate    Exercises:  Exercise 1: HEPpt educated on keeping his legs elevated and compressed at 30 mmHg, performing daily exercises including ankle pumps and reducing sodium and sugar in diet    Manual:  Soft Tissue Mobilizaton: MLD to B LE     Skin Integumentary  Skin Color: Red  Skin Condition/Temp: Cool  Skin Integrity: Wound (add Wound LDA)  Turgor:

## (undated) DEVICE — SOLUTION SCRB 4OZ 4% CHG H2O AIDED FOR PREOPERATIVE SKIN

## (undated) DEVICE — BLADE ES ELASTOMERIC COAT INSUL DURABLE BEND UPTO 90DEG

## (undated) DEVICE — Device

## (undated) DEVICE — SUTURE PROL SZ 3-0 L30IN NONABSORBABLE BLU L26MM SH 1/2 CIR 8832H

## (undated) DEVICE — Z DISCONTINUED BY MEDLINE USE 2151814 SPLINT ORTH W4INXL15FT FBRGLS RL FRM LO PROF PD 1 SIDE LTWT

## (undated) DEVICE — UNDERGLOVE SURG SZ 8 BLU LTX FREE SYN POLYISOPRENE POLYMER

## (undated) DEVICE — SUTURE PROL SZ 2-0 L30IN NONABSORBABLE BLU L26MM SH 1/2 CIR 8833H

## (undated) DEVICE — PREMIUM DRY TRAY LF: Brand: MEDLINE INDUSTRIES, INC.

## (undated) DEVICE — BANDAGE,GAUZE,BULKEE II,4.5"X4.1YD,STRL: Brand: MEDLINE

## (undated) DEVICE — PENCIL SMK EVAC L10FT TBNG NONSTICK ESU BLDE PLUMEPEN ELITE

## (undated) DEVICE — GLOVE ORANGE PI 7   MSG9070

## (undated) DEVICE — YANKAUER,BULB TIP,W/O VENT,RIGID,STERILE: Brand: MEDLINE

## (undated) DEVICE — BANDAGE COMPR W4INXL9FT EXSANG SGL LAYERED NO CLSR ESMARCH

## (undated) DEVICE — BANDAGE COBAN 4 IN COMPR W4INXL5YD FOAM COHESIVE QUIK STK SELF ADH SFT

## (undated) DEVICE — NEEDLE HYPO 25GA L1.5IN BLU POLYPR HUB S STL REG BVL STR

## (undated) DEVICE — GAUZE,SPONGE,FLUFF,6"X6.75",STRL,5/TRAY: Brand: MEDLINE

## (undated) DEVICE — HIGH CAPACITY NARROW INTRAMEDULLARY TIP: Brand: PULSAVAC®

## (undated) DEVICE — BLADE SURG NO15 S STL STR DISP GLASSVAN

## (undated) DEVICE — SUTURE ETHLN SZ 4-0 L18IN NONABSORBABLE BLK L19MM PS-2 3/8 1667H

## (undated) DEVICE — TUBING, SUCTION, 9/32" X 12', STRAIGHT: Brand: MEDLINE INDUSTRIES, INC.

## (undated) DEVICE — SHOE POSTOP XL M 12.5+ WOM 13.5+ SQUARED OPN TOE CLS HEEL

## (undated) DEVICE — STOCKINETTE ORTH W9XL36IN COT 2 PLY HLLW FOR HANDLING LMB

## (undated) DEVICE — HAND AND FT PK

## (undated) DEVICE — 3M™ IOBAN™ 2 ANTIMICROBIAL INCISE DRAPE 6650EZ: Brand: IOBAN™ 2